# Patient Record
(demographics unavailable — no encounter records)

---

## 2024-11-29 NOTE — HISTORY OF PRESENT ILLNESS
[de-identified] : congestion and not sleeping well x 1 day, fussy since this am as per mom. Tylenol given this am. No other c/o.  [FreeTextEntry6] : Congestion for a few days, very fussy today, irritible, crying. Tylenol given 2 hours ago. Seems to be feeling better.

## 2024-12-12 NOTE — HISTORY OF PRESENT ILLNESS
[Normal] : Normal [Pacifier use] : Pacifier use [Brushing teeth] : Brushing teeth [Playtime 60 min a day] : Playtime 60 min a day [No] : No cigarette smoke exposure [Car seat in back seat] : Car seat in back seat [Up to date] : Up to date [Temper Tantrums] : Temper Tantrums [Exposure to electronic nicotine delivery system] : No exposure to electronic nicotine delivery system [FreeTextEntry7] : 2 YR C [de-identified] : Blinking a lot, has eye doc appointment 12/20 [de-identified] : Eats a variety of foods including fruits, vegetables, and proteins. Drinks mostly water, has calcium source [FreeTextEntry8] : constipated @ times, using Senna and miralax every day [FreeTextEntry9] : Behavior concerns: rough with other kids, tantrums, will bang head on wall. Parents state  has been good with giving suggestions.

## 2024-12-12 NOTE — DISCUSSION/SUMMARY
[Normal Growth] : growth [Normal Development] : development [None] : No known medical problems [No Elimination Concerns] : elimination [No Feeding Concerns] : feeding [No Skin Concerns] : skin [Normal Sleep Pattern] : sleep [Assessment of Language Development] : assessment of language development [Temperament and Behavior] : temperament and behavior [Toilet Training] : toilet training [TV Viewing] : tv viewing [Safety] : safety [No Medications] : ~He/She~ is not on any medications [Parent/Guardian] : parent/guardian [] : The components of the vaccine(s) to be administered today are listed in the plan of care. The disease(s) for which the vaccine(s) are intended to prevent and the risks have been discussed with the caretaker.  The risks are also included in the appropriate vaccination information statements which have been provided to the patient's caregiver.  The caregiver has given consent to vaccinate. [FreeTextEntry1] : ASSESSMENT OF LANGUAGE DEVELOPMENT: Discussed  how child communicates, expectations for language.  TEMPERAMENT AND BEHAVIOR: Discussed  sensitivity, approachability, adaptability, intensity.  TOILET TRAINING: Discussed what parents have tried, techniques, personal hygiene.  SAFETY: Discussed car safety seats, parental use of safety belts, bike helmets, pool and outdoor safety, guns, poisons). Smoke and carbon monoxide monitors stressed. Lead exposure discussed.  5210 reviewed [place] 2 reviewed  [facility] reviewed Oral katia risk reviewed Amblyopia screen negative

## 2024-12-12 NOTE — PHYSICAL EXAM
[Alert] : alert [No Acute Distress] : no acute distress [Normocephalic] : normocephalic [Anterior Sparks Closed] : anterior fontanelle closed [Red Reflex Bilateral] : red reflex bilateral [PERRL] : PERRL [Normally Placed Ears] : normally placed ears [Auricles Well Formed] : auricles well formed [Clear Tympanic membranes with present light reflex and bony landmarks] : clear tympanic membranes with present light reflex and bony landmarks [No Discharge] : no discharge [Nares Patent] : nares patent [Palate Intact] : palate intact [Uvula Midline] : uvula midline [Tooth Eruption] : tooth eruption  [Supple, full passive range of motion] : supple, full passive range of motion [No Palpable Masses] : no palpable masses [Symmetric Chest Rise] : symmetric chest rise [Clear to Auscultation Bilaterally] : clear to auscultation bilaterally [Regular Rate and Rhythm] : regular rate and rhythm [S1, S2 present] : S1, S2 present [No Murmurs] : no murmurs [+2 Femoral Pulses] : +2 femoral pulses [Soft] : soft [NonTender] : non tender [Non Distended] : non distended [Normoactive Bowel Sounds] : normoactive bowel sounds [No Hepatomegaly] : no hepatomegaly [No Splenomegaly] : no splenomegaly [Central Urethral Opening] : central urethral opening [Testicles Descended Bilaterally] : testicles descended bilaterally [Patent] : patent [Normally Placed] : normally placed [No Abnormal Lymph Nodes Palpated] : no abnormal lymph nodes palpated [No Clavicular Crepitus] : no clavicular crepitus [Symmetric Buttocks Creases] : symmetric buttocks creases [No Spinal Dimple] : no spinal dimple [NoTuft of Hair] : no tuft of hair [Cranial Nerves Grossly Intact] : cranial nerves grossly intact [No Rash or Lesions] : no rash or lesions